# Patient Record
Sex: FEMALE | Race: BLACK OR AFRICAN AMERICAN | NOT HISPANIC OR LATINO
[De-identification: names, ages, dates, MRNs, and addresses within clinical notes are randomized per-mention and may not be internally consistent; named-entity substitution may affect disease eponyms.]

---

## 2018-05-09 PROBLEM — Z00.00 ENCOUNTER FOR PREVENTIVE HEALTH EXAMINATION: Status: ACTIVE | Noted: 2018-05-09

## 2018-05-17 ENCOUNTER — APPOINTMENT (OUTPATIENT)
Dept: PLASTIC SURGERY | Facility: CLINIC | Age: 18
End: 2018-05-17

## 2018-05-22 ENCOUNTER — APPOINTMENT (OUTPATIENT)
Dept: RHEUMATOLOGY | Facility: CLINIC | Age: 18
End: 2018-05-22
Payer: COMMERCIAL

## 2018-05-22 VITALS
HEART RATE: 97 BPM | RESPIRATION RATE: 20 BRPM | OXYGEN SATURATION: 98 % | SYSTOLIC BLOOD PRESSURE: 127 MMHG | HEIGHT: 70 IN | DIASTOLIC BLOOD PRESSURE: 80 MMHG | WEIGHT: 255 LBS | BODY MASS INDEX: 36.51 KG/M2 | TEMPERATURE: 97.8 F

## 2018-05-22 DIAGNOSIS — M17.11 UNILATERAL PRIMARY OSTEOARTHRITIS, RIGHT KNEE: ICD-10-CM

## 2018-05-22 DIAGNOSIS — Z78.9 OTHER SPECIFIED HEALTH STATUS: ICD-10-CM

## 2018-05-22 DIAGNOSIS — R76.8 OTHER SPECIFIED ABNORMAL IMMUNOLOGICAL FINDINGS IN SERUM: ICD-10-CM

## 2018-05-22 DIAGNOSIS — M17.12 UNILATERAL PRIMARY OSTEOARTHRITIS, LEFT KNEE: ICD-10-CM

## 2018-05-22 PROCEDURE — 36415 COLL VENOUS BLD VENIPUNCTURE: CPT

## 2018-05-22 PROCEDURE — 99204 OFFICE O/P NEW MOD 45 MIN: CPT | Mod: 25

## 2018-05-22 RX ORDER — NAPROXEN 500 MG/1
500 TABLET ORAL
Qty: 30 | Refills: 0 | Status: ACTIVE | COMMUNITY
Start: 2018-05-22 | End: 1900-01-01

## 2018-05-31 PROBLEM — Z78.9 NON-SMOKER: Status: ACTIVE | Noted: 2018-05-31

## 2018-06-05 ENCOUNTER — EMERGENCY (EMERGENCY)
Facility: HOSPITAL | Age: 18
LOS: 1 days | Discharge: HOME | End: 2018-06-05
Attending: EMERGENCY MEDICINE
Payer: COMMERCIAL

## 2018-06-05 VITALS
HEART RATE: 94 BPM | SYSTOLIC BLOOD PRESSURE: 129 MMHG | RESPIRATION RATE: 14 BRPM | HEIGHT: 70 IN | DIASTOLIC BLOOD PRESSURE: 87 MMHG | OXYGEN SATURATION: 100 % | TEMPERATURE: 101 F | WEIGHT: 205.03 LBS

## 2018-06-05 LAB
ALBUMIN SERPL ELPH-MCNC: 4 G/DL — SIGNIFICANT CHANGE UP (ref 3.3–5)
ALP SERPL-CCNC: 108 U/L — SIGNIFICANT CHANGE UP (ref 40–120)
ALT FLD-CCNC: 30 U/L — SIGNIFICANT CHANGE UP (ref 12–78)
ANION GAP SERPL CALC-SCNC: 8 MMOL/L — SIGNIFICANT CHANGE UP (ref 5–17)
APPEARANCE UR: CLEAR — SIGNIFICANT CHANGE UP
AST SERPL-CCNC: 24 U/L — SIGNIFICANT CHANGE UP (ref 15–37)
BACTERIA # UR AUTO: ABNORMAL
BASOPHILS # BLD AUTO: 0.02 K/UL — SIGNIFICANT CHANGE UP (ref 0–0.2)
BASOPHILS NFR BLD AUTO: 0.5 % — SIGNIFICANT CHANGE UP (ref 0–2)
BILIRUB SERPL-MCNC: 0.5 MG/DL — SIGNIFICANT CHANGE UP (ref 0.2–1.2)
BILIRUB UR-MCNC: NEGATIVE — SIGNIFICANT CHANGE UP
BUN SERPL-MCNC: 9 MG/DL — SIGNIFICANT CHANGE UP (ref 7–23)
CALCIUM SERPL-MCNC: 8.9 MG/DL — SIGNIFICANT CHANGE UP (ref 8.5–10.1)
CHLORIDE SERPL-SCNC: 106 MMOL/L — SIGNIFICANT CHANGE UP (ref 96–108)
CO2 SERPL-SCNC: 23 MMOL/L — SIGNIFICANT CHANGE UP (ref 22–31)
COLOR SPEC: SIGNIFICANT CHANGE UP
CREAT SERPL-MCNC: 0.99 MG/DL — SIGNIFICANT CHANGE UP (ref 0.5–1.3)
DIFF PNL FLD: NEGATIVE — SIGNIFICANT CHANGE UP
EOSINOPHIL # BLD AUTO: 0.02 K/UL — SIGNIFICANT CHANGE UP (ref 0–0.5)
EOSINOPHIL NFR BLD AUTO: 0.5 % — SIGNIFICANT CHANGE UP (ref 0–6)
EPI CELLS # UR: SIGNIFICANT CHANGE UP
GLUCOSE SERPL-MCNC: 79 MG/DL — SIGNIFICANT CHANGE UP (ref 70–99)
GLUCOSE UR QL: NEGATIVE — SIGNIFICANT CHANGE UP
HCG SERPL-ACNC: <1 MIU/ML — SIGNIFICANT CHANGE UP
HCT VFR BLD CALC: 38.2 % — SIGNIFICANT CHANGE UP (ref 34.5–45)
HGB BLD-MCNC: 12.8 G/DL — SIGNIFICANT CHANGE UP (ref 11.5–15.5)
IMM GRANULOCYTES NFR BLD AUTO: 0.2 % — SIGNIFICANT CHANGE UP (ref 0–1.5)
KETONES UR-MCNC: NEGATIVE — SIGNIFICANT CHANGE UP
LACTATE SERPL-SCNC: 0.9 MMOL/L — SIGNIFICANT CHANGE UP (ref 0.7–2)
LEUKOCYTE ESTERASE UR-ACNC: ABNORMAL
LYMPHOCYTES # BLD AUTO: 1.65 K/UL — SIGNIFICANT CHANGE UP (ref 1–3.3)
LYMPHOCYTES # BLD AUTO: 38.3 % — SIGNIFICANT CHANGE UP (ref 13–44)
MCHC RBC-ENTMCNC: 26.5 PG — LOW (ref 27–34)
MCHC RBC-ENTMCNC: 33.5 GM/DL — SIGNIFICANT CHANGE UP (ref 32–36)
MCV RBC AUTO: 79.1 FL — LOW (ref 80–100)
MONOCYTES # BLD AUTO: 0.57 K/UL — SIGNIFICANT CHANGE UP (ref 0–0.9)
MONOCYTES NFR BLD AUTO: 13.2 % — SIGNIFICANT CHANGE UP (ref 2–14)
NEUTROPHILS # BLD AUTO: 2.04 K/UL — SIGNIFICANT CHANGE UP (ref 1.8–7.4)
NEUTROPHILS NFR BLD AUTO: 47.3 % — SIGNIFICANT CHANGE UP (ref 43–77)
NITRITE UR-MCNC: NEGATIVE — SIGNIFICANT CHANGE UP
NRBC # BLD: 0 /100 WBCS — SIGNIFICANT CHANGE UP (ref 0–0)
PH UR: 5 — SIGNIFICANT CHANGE UP (ref 5–8)
PLATELET # BLD AUTO: 326 K/UL — SIGNIFICANT CHANGE UP (ref 150–400)
POTASSIUM SERPL-MCNC: 4.1 MMOL/L — SIGNIFICANT CHANGE UP (ref 3.5–5.3)
POTASSIUM SERPL-SCNC: 4.1 MMOL/L — SIGNIFICANT CHANGE UP (ref 3.5–5.3)
PROT SERPL-MCNC: 8.4 G/DL — HIGH (ref 6–8.3)
PROT UR-MCNC: NEGATIVE — SIGNIFICANT CHANGE UP
RAPID RVP RESULT: SIGNIFICANT CHANGE UP
RBC # BLD: 4.83 M/UL — SIGNIFICANT CHANGE UP (ref 3.8–5.2)
RBC # FLD: 13.8 % — SIGNIFICANT CHANGE UP (ref 10.3–14.5)
SODIUM SERPL-SCNC: 137 MMOL/L — SIGNIFICANT CHANGE UP (ref 135–145)
SP GR SPEC: 1.01 — SIGNIFICANT CHANGE UP (ref 1.01–1.02)
UROBILINOGEN FLD QL: NEGATIVE — SIGNIFICANT CHANGE UP
WBC # BLD: 4.31 K/UL — SIGNIFICANT CHANGE UP (ref 3.8–10.5)
WBC # FLD AUTO: 4.31 K/UL — SIGNIFICANT CHANGE UP (ref 3.8–10.5)
WBC UR QL: SIGNIFICANT CHANGE UP

## 2018-06-05 PROCEDURE — 87581 M.PNEUMON DNA AMP PROBE: CPT

## 2018-06-05 PROCEDURE — 87486 CHLMYD PNEUM DNA AMP PROBE: CPT

## 2018-06-05 PROCEDURE — 36415 COLL VENOUS BLD VENIPUNCTURE: CPT

## 2018-06-05 PROCEDURE — 84702 CHORIONIC GONADOTROPIN TEST: CPT

## 2018-06-05 PROCEDURE — 71046 X-RAY EXAM CHEST 2 VIEWS: CPT | Mod: 26

## 2018-06-05 PROCEDURE — 81001 URINALYSIS AUTO W/SCOPE: CPT

## 2018-06-05 PROCEDURE — 99285 EMERGENCY DEPT VISIT HI MDM: CPT

## 2018-06-05 PROCEDURE — 70450 CT HEAD/BRAIN W/O DYE: CPT

## 2018-06-05 PROCEDURE — 87633 RESP VIRUS 12-25 TARGETS: CPT

## 2018-06-05 PROCEDURE — 85027 COMPLETE CBC AUTOMATED: CPT

## 2018-06-05 PROCEDURE — 87086 URINE CULTURE/COLONY COUNT: CPT

## 2018-06-05 PROCEDURE — 96375 TX/PRO/DX INJ NEW DRUG ADDON: CPT

## 2018-06-05 PROCEDURE — 87798 DETECT AGENT NOS DNA AMP: CPT

## 2018-06-05 PROCEDURE — 96374 THER/PROPH/DIAG INJ IV PUSH: CPT

## 2018-06-05 PROCEDURE — 87040 BLOOD CULTURE FOR BACTERIA: CPT

## 2018-06-05 PROCEDURE — 83605 ASSAY OF LACTIC ACID: CPT

## 2018-06-05 PROCEDURE — 70450 CT HEAD/BRAIN W/O DYE: CPT | Mod: 26

## 2018-06-05 PROCEDURE — 99284 EMERGENCY DEPT VISIT MOD MDM: CPT | Mod: 25

## 2018-06-05 PROCEDURE — 71046 X-RAY EXAM CHEST 2 VIEWS: CPT

## 2018-06-05 PROCEDURE — 80053 COMPREHEN METABOLIC PANEL: CPT

## 2018-06-05 RX ORDER — KETOROLAC TROMETHAMINE 30 MG/ML
30 SYRINGE (ML) INJECTION ONCE
Qty: 0 | Refills: 0 | Status: DISCONTINUED | OUTPATIENT
Start: 2018-06-05 | End: 2018-06-05

## 2018-06-05 RX ORDER — METOCLOPRAMIDE HCL 10 MG
10 TABLET ORAL ONCE
Qty: 0 | Refills: 0 | Status: COMPLETED | OUTPATIENT
Start: 2018-06-05 | End: 2018-06-05

## 2018-06-05 RX ORDER — SODIUM CHLORIDE 9 MG/ML
1000 INJECTION INTRAMUSCULAR; INTRAVENOUS; SUBCUTANEOUS ONCE
Qty: 0 | Refills: 0 | Status: COMPLETED | OUTPATIENT
Start: 2018-06-05 | End: 2018-06-05

## 2018-06-05 RX ADMIN — Medication 30 MILLIGRAM(S): at 23:44

## 2018-06-05 RX ADMIN — SODIUM CHLORIDE 1000 MILLILITER(S): 9 INJECTION INTRAMUSCULAR; INTRAVENOUS; SUBCUTANEOUS at 23:44

## 2018-06-05 RX ADMIN — Medication 10 MILLIGRAM(S): at 22:07

## 2018-06-05 RX ADMIN — SODIUM CHLORIDE 1000 MILLILITER(S): 9 INJECTION INTRAMUSCULAR; INTRAVENOUS; SUBCUTANEOUS at 22:07

## 2018-06-05 RX ADMIN — Medication 30 MILLIGRAM(S): at 22:40

## 2018-06-05 NOTE — ED PROVIDER NOTE - LAB INTERPRETATION
Rapid RVP Result: [facility]: [facility] [employer] RVP Rapid uses polymerase chain reaction (PCR) and melt  curve analysis to screen for adenovirus; coronavirus HKU1, NL63, 229E,  OC43; human metapneumovirus (hMPV); human enterovirus/rhinovirus  (Entero/RV); influenza A; influenza A/H1;influenza A/H3; influenza  A/H1-2009; influenza B; parainfluenza viruses 1, 2, 3, 4; respiratory  syncytial virus; Bordetella pertussis; Mycoplasma pneumoniae; and  Chlamydophila pneumoniae. (06.05.18 @ 21:46)

## 2018-06-05 NOTE — ED PROVIDER NOTE - PROGRESS NOTE DETAILS
Reevaluated patient at bedside.  Patient feeling well, refusing LP.  Discussed the results of all diagnostic testing in ED and copies of all reports given.   An opportunity to ask questions was given.  Discussed the importance of prompt, close medical follow-up.  Patient will return with any changes, concerns or persistent / worsening symptoms.  Understanding of all instructions verbalized.

## 2018-06-05 NOTE — ED ADULT NURSE NOTE - CHPI ED SYMPTOMS NEG
no loss of consciousness/no confusion/no change in level of consciousness/no dizziness/no weakness/no numbness

## 2018-06-05 NOTE — ED PROVIDER NOTE - OBJECTIVE STATEMENT
pt c/o 2-3 days of chills, generalized weakness, mild cough, ha, nausea. no fever, cp, sob, abd pain, vomiting, diarrhea, dysuria, freq.  pmd - marina

## 2018-06-05 NOTE — ED ADULT NURSE NOTE - OBJECTIVE STATEMENT
Pt. presenting to the ED complaining of a headache that has lasted four days. Pt. has a chronic migraine history but the head aches generally only last one day. She is not on any medication for the migraines. She describes the head ache as 10/10 pain, accompanied with nausea and vomiting. She has had chills and her fever was 100.8 in triage. Bright lights worsen the head ache and her vision is blurry when the head ache is at its worst. Denies diarrhea, deneis CP, denies syncope.

## 2018-06-05 NOTE — ED PROVIDER NOTE - MUSCULOSKELETAL, MLM
Spine appears normal, range of motion is not limited, no muscle or joint tenderness.  no meningismus

## 2018-06-06 VITALS
HEART RATE: 89 BPM | DIASTOLIC BLOOD PRESSURE: 86 MMHG | RESPIRATION RATE: 16 BRPM | TEMPERATURE: 99 F | OXYGEN SATURATION: 99 % | SYSTOLIC BLOOD PRESSURE: 121 MMHG

## 2018-06-06 LAB
CULTURE RESULTS: NO GROWTH — SIGNIFICANT CHANGE UP
SPECIMEN SOURCE: SIGNIFICANT CHANGE UP

## 2018-06-10 LAB
CULTURE RESULTS: SIGNIFICANT CHANGE UP
CULTURE RESULTS: SIGNIFICANT CHANGE UP
SPECIMEN SOURCE: SIGNIFICANT CHANGE UP
SPECIMEN SOURCE: SIGNIFICANT CHANGE UP

## 2018-06-29 ENCOUNTER — APPOINTMENT (OUTPATIENT)
Dept: RHEUMATOLOGY | Facility: CLINIC | Age: 18
End: 2018-06-29

## 2019-01-24 LAB
CCP AB SER IA-ACNC: <8 UNITS
CRP SERPL-MCNC: 1.2 MG/DL
ERYTHROCYTE [SEDIMENTATION RATE] IN BLOOD BY WESTERGREN METHOD: 26 MM/HR
RF+CCP IGG SER-IMP: NEGATIVE
RHEUMATOID FACT SER QL: 55 IU/ML
URATE SERPL-MCNC: 5.4 MG/DL

## 2020-01-10 ENCOUNTER — RESULT REVIEW (OUTPATIENT)
Age: 20
End: 2020-01-10

## 2020-02-24 NOTE — ED ADULT NURSE NOTE - NSSISCREENINGQ4_ED_A_ED
Suprapubic Cystostomy     WHAT YOU NEED TO KNOW:   Suprapubic cystostomy is surgery to create a stoma (opening) through your abdomen into your bladder  This opening is where a catheter is inserted to drain urine  You may need a cystostomy if your urine flow is blocked  DISCHARGE INSTRUCTIONS:   Resume your normal diet  Small sips of flat soda will help with mild nausea  Follow up with your healthcare provider as directed: You may need to return to have your suprapubic catheter changed or removed  Write down your questions so you remember to ask them during your visits  Empty your urine drainage bag: Empty your urine drainage bag when it is ½ to ? full, or every 8 hours  If you have a smaller leg bag, empty it every 3 to 4 hours  Do the following when you empty your urine drainage bag:  · Hold the urine bag over a toilet or large container  · Remove the drain spout from its sleeve at the bottom of the urine bag  Do not touch the tip of the drain spout  Open the slide valve on the spout  · Let the urine flow out of the urine bag into the toilet or container  Do not let the drainage tube touch anything  · Clean the end of the drain spout with alcohol when the bag is empty  Ask which cleaning solution is best to use  · Close the slide valve and put the drain spout into its sleeve at the bottom of the urine bag  Write down how much urine was in your bag if you were asked to keep a record  Prevent an infection:   · Clean the stoma and skin around it daily  Wash your hands before and after cystostomy care  Put on a new pair of clean medical gloves  · Change your urine bag or clean reusable bags  Ask how often you need to change or clean your urine drainage bag  You may need to change your reusable bag at least once a week  · Keep the bag below your waist  This will prevent urine from flowing back into your bladder and causing an infection or other problems   Also, keep the tube free of kinks so the urine will drain properly  Do not pull on the catheter  This can cause pain and bleeding and may cause the catheter to come out  Contact Interventional Radiology at [phone] Devyn PATIENTS: Contact Interventional Radiology at [phone]) Batsheva Mchugh PATIENTS: Contact Interventional Radiology at [phone]) if any of the following occur:  · You have a fever or chills  · Persistent nausea or vomiting  · You have severe pain  · You have a burning pain in your stoma  · Your stoma is red, swollen, or draining pus  · You have blood in your urine  · You have questions or concerns about your condition or care    Seek care immediately or call 911 if:   · No urine is draining into the urine bag        No

## 2021-02-24 ENCOUNTER — RESULT REVIEW (OUTPATIENT)
Age: 21
End: 2021-02-24

## 2021-11-11 ENCOUNTER — RESULT REVIEW (OUTPATIENT)
Age: 21
End: 2021-11-11

## 2021-11-12 NOTE — ED ADULT NURSE NOTE - CAS EDN INTEG ASSESS
Final Anesthesia Post-op Assessment    Patient: Rosibel Bennett  Procedure(s) Performed: ENDOSCOPIC ULTRASOUND  Anesthesia type: MAC    Vitals Value Taken Time   Temp 36.5 11/12/21 1124   Pulse 60 11/12/21 1124   Resp 19 11/12/21 1124   SpO2 100 11/12/21 1124   /75 11/12/21 1124         Patient Location: bedside  Post-op Vital Signs:stable  Level of Consciousness: participates in exam, awake, oriented, answers questions appropriately and alert  Respiratory Status: spontaneous ventilation and room air  Cardiovascular blood pressure returned to baseline  Hydration: euvolemic  Pain Management: adequately controlled  Handoff: Handoff to receiving nurse was performed and questions were answered  Nausea: None  Airway Patency:patent  Post-op Assessment: awake, alert, appropriately conversant, or baseline, no complications, patient tolerated procedure well with no complications and no evidence of recall      No complications documented.    WDL

## 2021-12-08 ENCOUNTER — RESULT REVIEW (OUTPATIENT)
Age: 21
End: 2021-12-08

## 2021-12-20 ENCOUNTER — NON-APPOINTMENT (OUTPATIENT)
Age: 21
End: 2021-12-20

## 2024-08-07 ENCOUNTER — APPOINTMENT (OUTPATIENT)
Dept: GYNECOLOGIC ONCOLOGY | Facility: CLINIC | Age: 24
End: 2024-08-07

## 2024-08-07 PROBLEM — Z80.3 FAMILY HISTORY OF MALIGNANT NEOPLASM OF BREAST: Status: ACTIVE | Noted: 2024-08-07

## 2024-08-07 PROBLEM — N87.9 CERVICAL DYSPLASIA: Status: ACTIVE | Noted: 2024-08-07

## 2024-08-07 PROCEDURE — 99459 PELVIC EXAMINATION: CPT

## 2024-08-07 PROCEDURE — 99204 OFFICE O/P NEW MOD 45 MIN: CPT

## 2024-08-07 NOTE — DISCUSSION/SUMMARY
[FreeTextEntry1] : Patient presenting with cervical dysplasia - noted to have alex 2 on biopsy and ecc. Recommend excisional procedure. Patient understands plan for cone biopsy. Planned surgery - exam under anesthesia, cone biopsy  Risks discussed including bleeding, infection, injury to surrounding organs including bowel/bladder/vessels/nerves/ureters  Benefit - diagnosis to r/o cancer and treatment of cervical dysplasia  Plan for OR in October, patient is away in sept and also will have insurance coverage.  10/4 at [facility] pst only slide review to luis

## 2024-08-07 NOTE — PHYSICAL EXAM
[Chaperone Present] : A chaperone was present in the examining room during all aspects of the physical examination [86064] : A chaperone was present during the pelvic exam. [Normal] : Recto-Vaginal Exam: Normal [de-identified] : normal appearing cervix, no clear mass [Fully active, able to carry on all pre-disease performance without restriction] : Status 0 - Fully active, able to carry on all pre-disease performance without restriction

## 2024-08-07 NOTE — LETTER BODY
[Dear  ___] : Dear ~MAMI, [I had the pleasure of evaluating your patient, [unfilled] for ___] : I had the pleasure of evaluating your patient, [unfilled] for [unfilled]. [Attached please find my note.] : Attached please find my note.

## 2024-08-07 NOTE — HISTORY OF PRESENT ILLNESS
[FreeTextEntry1] : GYN/Ref: Blanca Rowley, NP  Ms. Hodges, 23 years old, referred for RAMSES-2. Presenting for consultation to discuss next steps. h/o abnormal paps.  Pathology: 2024 colposcopy ([facility]) 1.  ECC: RAMSES-1, a few cells suspicious for high-grade squamous intraepithelial lesion (RAMSES-2) 2.  Cervix 12:00: At least RAMSES-1, cannot exclude focal high-grade squamous intraepithelial lesion (RAMSES 2) 3.  Cervix 4:00: RAMSES-2 4.  Cervix 6:00: RAMSES-2  2021 colposcopy ([facility]) 1. Cervix, 11 o'clock, biopsy: -Cervix transformation zone with acute and chronic inflammation -Moderate to marked chronic inflammation of endocervix  Pap: 2024: LSIL; + HPV mRNA ([employer]) 2021: LSIL ([facility]) 2021: LSIL ([facility]) 1/10/2020: ASCUS ([facility])  Imagin/15/19 TVUS ([facility]) Uerus is normal and measures 7.1 x 3.3 x 4.6 cm. The endometrial echo measures 0.3 cm.  The right ovary measures 3.1 x 2.4 x 2.4 cm.   The left ovary measures 2.6 x 3.0 x 1.8 cm.  No significant free fluid was seen within the cul-de-sac.  IMPRESSION:  Normal endovaginal pelvic sonogram    Health Maintenance: Mammogram: Colonoscopy: DEXA: PAP:

## 2024-08-07 NOTE — DISCUSSION/SUMMARY
[FreeTextEntry1] : Patient presenting with cervical dysplasia - noted to have alex 2 on biopsy and ecc. Recommend excisional procedure. Patient understands plan for cone biopsy. Planned surgery - exam under anesthesia, cone biopsy  Risks discussed including bleeding, infection, injury to surrounding organs including bowel/bladder/vessels/nerves/ureters  Benefit - diagnosis to r/o cancer and treatment of cervical dysplasia  Plan for OR in October, patient is away in sept and also will have insurance coverage.  10/4 at [facility] pst only slide review to luis

## 2024-08-07 NOTE — PHYSICAL EXAM
[Chaperone Present] : A chaperone was present in the examining room during all aspects of the physical examination [02382] : A chaperone was present during the pelvic exam. [Normal] : Recto-Vaginal Exam: Normal [de-identified] : normal appearing cervix, no clear mass [Fully active, able to carry on all pre-disease performance without restriction] : Status 0 - Fully active, able to carry on all pre-disease performance without restriction

## 2024-08-07 NOTE — HISTORY OF PRESENT ILLNESS
[FreeTextEntry1] : GYN/Ref: Blanca Rowley, NP  Ms. Hodges, 23 years old, referred for RAMSES-2. Presenting for consultation to discuss next steps. h/o abnormal paps.  Pathology: 2024 colposcopy ([facility]) 1.  ECC: RAMSES-1, a few cells suspicious for high-grade squamous intraepithelial lesion (RAMSES-2) 2.  Cervix 12:00: At least RAMSES-1, cannot exclude focal high-grade squamous intraepithelial lesion (RAMSES 2) 3.  Cervix 4:00: RAMSES-2 4.  Cervix 6:00: RAMSES-2  2021 colposcopy ([facility]) 1. Cervix, 11 o'clock, biopsy: -Cervix transformation zone with acute and chronic inflammation -Moderate to marked chronic inflammation of endocervix  Pap: 2024: LSIL; + HPV mRNA ([employer]) 2021: LSIL ([facility]) 2021: LSIL ([facility]) 1/10/2020: ASCUS ([facility])  Imagin/15/19 TVUS ([facility]) Uerus is normal and measures 7.1 x 3.3 x 4.6 cm. The endometrial echo measures 0.3 cm.  The right ovary measures 3.1 x 2.4 x 2.4 cm.   The left ovary measures 2.6 x 3.0 x 1.8 cm.  No significant free fluid was seen within the cul-de-sac.  IMPRESSION:  Normal endovaginal pelvic sonogram    Health Maintenance: Mammogram: Colonoscopy: DEXA: PAP:

## 2024-08-13 ENCOUNTER — RESULT REVIEW (OUTPATIENT)
Age: 24
End: 2024-08-13

## 2024-09-03 ENCOUNTER — EMERGENCY (EMERGENCY)
Facility: HOSPITAL | Age: 24
LOS: 1 days | Discharge: HOME | End: 2024-09-03
Attending: STUDENT IN AN ORGANIZED HEALTH CARE EDUCATION/TRAINING PROGRAM | Admitting: STUDENT IN AN ORGANIZED HEALTH CARE EDUCATION/TRAINING PROGRAM
Payer: SELF-PAY

## 2024-09-03 VITALS
TEMPERATURE: 98 F | HEART RATE: 72 BPM | RESPIRATION RATE: 18 BRPM | OXYGEN SATURATION: 98 % | DIASTOLIC BLOOD PRESSURE: 81 MMHG | SYSTOLIC BLOOD PRESSURE: 130 MMHG

## 2024-09-03 VITALS
RESPIRATION RATE: 14 BRPM | TEMPERATURE: 98 F | DIASTOLIC BLOOD PRESSURE: 78 MMHG | SYSTOLIC BLOOD PRESSURE: 126 MMHG | WEIGHT: 182.1 LBS | HEART RATE: 65 BPM | HEIGHT: 70 IN

## 2024-09-03 LAB — HCG UR QL: NEGATIVE — SIGNIFICANT CHANGE UP

## 2024-09-03 PROCEDURE — 99284 EMERGENCY DEPT VISIT MOD MDM: CPT | Mod: 25

## 2024-09-03 PROCEDURE — 70450 CT HEAD/BRAIN W/O DYE: CPT | Mod: MC

## 2024-09-03 PROCEDURE — 81025 URINE PREGNANCY TEST: CPT

## 2024-09-03 PROCEDURE — 99285 EMERGENCY DEPT VISIT HI MDM: CPT

## 2024-09-03 PROCEDURE — 72100 X-RAY EXAM L-S SPINE 2/3 VWS: CPT | Mod: 26

## 2024-09-03 PROCEDURE — 72125 CT NECK SPINE W/O DYE: CPT | Mod: 26,MC

## 2024-09-03 PROCEDURE — 70450 CT HEAD/BRAIN W/O DYE: CPT | Mod: 26,MC

## 2024-09-03 PROCEDURE — 72125 CT NECK SPINE W/O DYE: CPT | Mod: MC

## 2024-09-03 PROCEDURE — 72100 X-RAY EXAM L-S SPINE 2/3 VWS: CPT

## 2024-09-03 RX ORDER — IBUPROFEN 600 MG
600 TABLET ORAL ONCE
Refills: 0 | Status: COMPLETED | OUTPATIENT
Start: 2024-09-03 | End: 2024-09-03

## 2024-09-03 RX ADMIN — Medication 600 MILLIGRAM(S): at 22:36
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
## 2024-09-03 NOTE — ED PROVIDER NOTE - CARE PROVIDER_API CALL
Deepa Milton  Internal Medicine  [address] [place] [id number]  Phone: [phone]  Fax: [phone]  Follow Up Time:     Julien Yanes  Orthopaedic Surgery  [address] [place] [address], [facility] [address]  [place] [address]  Phone: [phone]  Fax: [phone]  Follow Up Time:

## 2024-09-03 NOTE — ED PROVIDER NOTE - NSCAREINITIATED _GEN_ER
Addended by: YESSENIA GERONIMO on: 11/17/2023 11:37 AM     Modules accepted: Orders    
Jane Qiu(Attending)

## 2024-09-03 NOTE — ED ADULT NURSE NOTE - CINV DISCH MEDS REVIEWED YN
Final Anesthesia Post-op Assessment    Patient: Avis Rice  Procedure(s) Performed: EGD (ESOPHAGOGASTRODUODENOSCOPY) [[id number]]  Anesthesia type: MAC    Vitals Value Taken Time   Temp 37.1 °C (98.8 °F) 01/28/21 1204   Pulse 98 01/28/21 1205   Resp 16 01/28/21 1208   SpO2 98 % 01/28/21 1205   /55 01/28/21 1205         Patient Location: Phase II  Post-op Vital Signs:stable  Level of Consciousness: participates in exam, awake, alert and oriented  Respiratory Status: spontaneous ventilation  Cardiovascular stable  Hydration: euvolemic  Pain Management: well controlled  Handoff: Handoff to receiving clinician was performed and questions were answered  Vomiting: none   Nausea: None  Airway Patency:patent  Post-op Assessment: awake, alert, appropriately conversant, or baseline, no complications and patient tolerated procedure well with no complications      No complications documented.   
Yes
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
## 2024-09-03 NOTE — ED PROVIDER NOTE - PATIENT PORTAL LINK FT
You can access the FollowMyHealth Patient Portal offered by [facility] by registering at the following website: [email or web address]. By joining [employer]’s FollowMyHealth portal, you will also be able to view your health information using other applications (apps) compatible with our system.

## 2024-09-03 NOTE — ED PROVIDER NOTE - PROGRESS NOTE DETAILS
Results of work up d/w patient and father at bedside.  Advised rest, NSAIDS, PMD and ortho follow up.  Return precautions discussed.  Patient expressed understanding of discharge instructions.

## 2024-09-03 NOTE — ED PROVIDER NOTE - MUSCULOSKELETAL, MLM
Spine appears normal, range of motion is not limited, no muscle or joint tenderness, 5/5 LE strength bilaterally.  No LE edema, normal sensation
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
## 2024-09-03 NOTE — ED ADULT NURSE NOTE - HOW OFTEN DO YOU HAVE A DRINK CONTAINING ALCOHOL?
<<-----Click on this checkbox to enter Procedure Cystoscopy with insertion of stent  06/01/2018    Active  TSPEARS Never
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
## 2024-09-03 NOTE — ED PROVIDER NOTE - DIFFERENTIAL DIAGNOSIS
Ddx includes but not limited to ICH, herniated disc, c-spine fracture, skull fracture, concussion, contusion, lumbar fracture, spasm, muscle strain Differential Diagnosis

## 2024-09-03 NOTE — ED PROVIDER NOTE - PHYSICAL EXAMINATION
Head NC/AT, full ROM of c-spine, no midline tenderness or deformity  Gait steady, hips and pelvis stable  No LS spine tenderness, deformity, step-off

## 2024-10-04 ENCOUNTER — APPOINTMENT (OUTPATIENT)
Dept: GYNECOLOGIC ONCOLOGY | Facility: HOSPITAL | Age: 24
End: 2024-10-04

## 2024-10-23 ENCOUNTER — APPOINTMENT (OUTPATIENT)
Dept: GYNECOLOGIC ONCOLOGY | Facility: CLINIC | Age: 24
End: 2024-10-23

## 2025-02-03 ENCOUNTER — NON-APPOINTMENT (OUTPATIENT)
Age: 25
End: 2025-02-03

## 2025-02-03 ENCOUNTER — APPOINTMENT (OUTPATIENT)
Dept: GYNECOLOGIC ONCOLOGY | Facility: CLINIC | Age: 25
End: 2025-02-03

## 2025-04-01 ENCOUNTER — APPOINTMENT (OUTPATIENT)
Dept: GYNECOLOGIC ONCOLOGY | Facility: CLINIC | Age: 25
End: 2025-04-01

## 2025-04-01 DIAGNOSIS — N87.9 DYSPLASIA OF CERVIX UTERI, UNSPECIFIED: ICD-10-CM

## 2025-06-25 ENCOUNTER — APPOINTMENT (OUTPATIENT)
Dept: GYNECOLOGIC ONCOLOGY | Facility: CLINIC | Age: 25
End: 2025-06-25
Payer: COMMERCIAL

## 2025-06-25 VITALS
SYSTOLIC BLOOD PRESSURE: 145 MMHG | HEIGHT: 70 IN | OXYGEN SATURATION: 98 % | BODY MASS INDEX: 27.77 KG/M2 | TEMPERATURE: 98.3 F | WEIGHT: 194 LBS | DIASTOLIC BLOOD PRESSURE: 73 MMHG | HEART RATE: 62 BPM

## 2025-06-25 PROCEDURE — 99214 OFFICE O/P EST MOD 30 MIN: CPT | Mod: 25

## 2025-06-25 PROCEDURE — 57454 BX/CURETT OF CERVIX W/SCOPE: CPT

## 2025-06-25 PROCEDURE — 99459 PELVIC EXAMINATION: CPT

## 2025-06-26 LAB — HPV HIGH+LOW RISK DNA PNL CVX: DETECTED

## 2025-07-01 LAB — CYTOLOGY CVX/VAG DOC THIN PREP: ABNORMAL

## 2025-07-02 LAB — CORE LAB BIOPSY: NORMAL

## 2025-08-13 ENCOUNTER — APPOINTMENT (OUTPATIENT)
Dept: GYNECOLOGIC ONCOLOGY | Facility: CLINIC | Age: 25
End: 2025-08-13